# Patient Record
Sex: MALE | Race: WHITE | Employment: FULL TIME | URBAN - METROPOLITAN AREA
[De-identification: names, ages, dates, MRNs, and addresses within clinical notes are randomized per-mention and may not be internally consistent; named-entity substitution may affect disease eponyms.]

---

## 2017-12-26 PROBLEM — M23.92 ACUTE INTERNAL DERANGEMENT OF LEFT KNEE: Status: ACTIVE | Noted: 2017-12-26

## 2017-12-26 PROBLEM — M25.562 LEFT MEDIAL KNEE PAIN: Status: ACTIVE | Noted: 2017-12-26

## 2018-01-03 PROBLEM — S83.242D ACUTE MEDIAL MENISCAL TEAR, LEFT, SUBSEQUENT ENCOUNTER: Status: ACTIVE | Noted: 2018-01-03

## 2018-02-24 ENCOUNTER — HOSPITAL ENCOUNTER (OUTPATIENT)
Age: 43
Discharge: HOME | End: 2018-02-24
Attending: FAMILY MEDICINE
Payer: COMMERCIAL

## 2018-02-24 VITALS
OXYGEN SATURATION: 96 % | HEART RATE: 76 BPM | SYSTOLIC BLOOD PRESSURE: 122 MMHG | DIASTOLIC BLOOD PRESSURE: 82 MMHG | TEMPERATURE: 97 F | BODY MASS INDEX: 24 KG/M2 | WEIGHT: 175 LBS | RESPIRATION RATE: 20 BRPM

## 2018-02-24 DIAGNOSIS — H10.022 OTHER MUCOPURULENT CONJUNCTIVITIS OF LEFT EYE: Primary | ICD-10-CM

## 2018-02-24 PROCEDURE — 99214 OFFICE O/P EST MOD 30 MIN: CPT

## 2018-02-24 PROCEDURE — 99213 OFFICE O/P EST LOW 20 MIN: CPT

## 2018-02-24 RX ORDER — TOBRAMYCIN 3 MG/ML
2 SOLUTION/ DROPS OPHTHALMIC EVERY 4 HOURS
Qty: 1 BOTTLE | Refills: 0 | Status: SHIPPED | OUTPATIENT
Start: 2018-02-24 | End: 2018-03-03

## 2018-02-24 NOTE — ED PROVIDER NOTES
Patient Seen in: [facility]    History   Patient presents with:   Eye Visual Problem (opthalmic): Lt. itching & pain    Stated Complaint: LEFT EYE IRRITATION/PINK EYE    HPI    37year old male presents for left eye irritation an reactive to light. Neck: Normal range of motion. Neck supple. Cardiovascular: Normal rate, regular rhythm, normal heart sounds and intact distal pulses.     Pulmonary/Chest: Effort normal and breath sounds normal.   Neurological: He is alert and oriente

## 2018-02-24 NOTE — ED INITIAL ASSESSMENT (HPI)
Pt. States he started with Lt. Eye irritation & itching on Thursday. Yesterday Lt. Eye pain. Today, woke up with Lt. Eye crusting & drainage. No fever no cold symptoms.

## 2018-12-31 ENCOUNTER — HOSPITAL ENCOUNTER (OUTPATIENT)
Age: 43
Discharge: HOME | End: 2018-12-31
Attending: FAMILY MEDICINE
Payer: COMMERCIAL

## 2018-12-31 VITALS
DIASTOLIC BLOOD PRESSURE: 75 MMHG | TEMPERATURE: 99 F | SYSTOLIC BLOOD PRESSURE: 118 MMHG | RESPIRATION RATE: 20 BRPM | HEART RATE: 82 BPM | OXYGEN SATURATION: 97 %

## 2018-12-31 DIAGNOSIS — S05.02XA ABRASION OF LEFT CORNEA, INITIAL ENCOUNTER: Primary | ICD-10-CM

## 2018-12-31 PROCEDURE — 99213 OFFICE O/P EST LOW 20 MIN: CPT

## 2018-12-31 PROCEDURE — 99214 OFFICE O/P EST MOD 30 MIN: CPT

## 2018-12-31 RX ORDER — OFLOXACIN 3 MG/ML
2 SOLUTION/ DROPS OPHTHALMIC 3 TIMES DAILY
Qty: 5 ML | Refills: 0 | Status: SHIPPED | OUTPATIENT
Start: 2018-12-31 | End: 2019-01-07

## 2018-12-31 NOTE — ED PROVIDER NOTES
Patient Seen in: [facility]    History   Patient presents with:  Eye Problem    Stated Complaint: left eye pink     HPI  This is a 49-year-old male coming in with complaints of discomfort in his left eye, describes this discomfo Nares normal  NECK: FROM, supple  LUNGS: No tachypnea   CV: No tachycardia   ABD: not distended  NEURO: Alert and oriented to person place and time  GAIT: Normal    L eye exam:   - Orbits and periorbital area normal : Yes  - PERRL+ : Yes  - EOMI+: Yes  - C

## 2022-08-12 ENCOUNTER — OFFICE VISIT (OUTPATIENT)
Dept: FAMILY MEDICINE CLINIC | Facility: CLINIC | Age: 47
End: 2022-08-12
Payer: COMMERCIAL

## 2022-08-12 VITALS
WEIGHT: 189 LBS | DIASTOLIC BLOOD PRESSURE: 68 MMHG | OXYGEN SATURATION: 97 % | HEIGHT: 71 IN | BODY MASS INDEX: 26.46 KG/M2 | RESPIRATION RATE: 16 BRPM | HEART RATE: 65 BPM | SYSTOLIC BLOOD PRESSURE: 108 MMHG

## 2022-08-12 DIAGNOSIS — Z82.49 FAMILY HISTORY OF CORONARY ARTERY DISEASE: ICD-10-CM

## 2022-08-12 DIAGNOSIS — Z00.00 WELLNESS EXAMINATION: Primary | ICD-10-CM

## 2022-08-12 DIAGNOSIS — Z00.00 LABORATORY EXAMINATION ORDERED AS PART OF A ROUTINE GENERAL MEDICAL EXAMINATION: ICD-10-CM

## 2022-08-12 DIAGNOSIS — Z12.5 SCREENING FOR MALIGNANT NEOPLASM OF PROSTATE: ICD-10-CM

## 2022-08-12 PROCEDURE — 3008F BODY MASS INDEX DOCD: CPT | Performed by: FAMILY MEDICINE

## 2022-08-12 PROCEDURE — 99386 PREV VISIT NEW AGE 40-64: CPT | Performed by: FAMILY MEDICINE

## 2022-08-12 PROCEDURE — 3078F DIAST BP <80 MM HG: CPT | Performed by: FAMILY MEDICINE

## 2022-08-12 PROCEDURE — 3074F SYST BP LT 130 MM HG: CPT | Performed by: FAMILY MEDICINE

## 2022-08-12 RX ORDER — MULTIVITAMIN
1 TABLET ORAL DAILY
COMMUNITY

## 2022-08-13 ENCOUNTER — LAB ENCOUNTER (OUTPATIENT)
Dept: LAB | Age: 47
End: 2022-08-13
Attending: FAMILY MEDICINE
Payer: COMMERCIAL

## 2022-08-13 DIAGNOSIS — Z12.5 SCREENING FOR MALIGNANT NEOPLASM OF PROSTATE: ICD-10-CM

## 2022-08-13 DIAGNOSIS — Z00.00 LABORATORY EXAMINATION ORDERED AS PART OF A ROUTINE GENERAL MEDICAL EXAMINATION: ICD-10-CM

## 2022-08-13 LAB
ALBUMIN SERPL-MCNC: 4 G/DL (ref 3.4–5)
ALBUMIN/GLOB SERPL: 1.3 {RATIO} (ref 1–2)
ALP LIVER SERPL-CCNC: 73 U/L
ALT SERPL-CCNC: 23 U/L
ANION GAP SERPL CALC-SCNC: 4 MMOL/L (ref 0–18)
AST SERPL-CCNC: 20 U/L (ref 15–37)
BASOPHILS # BLD AUTO: 0.07 X10(3) UL (ref 0–0.2)
BASOPHILS NFR BLD AUTO: 1.2 %
BILIRUB SERPL-MCNC: 0.8 MG/DL (ref 0.1–2)
BUN BLD-MCNC: 12 MG/DL (ref 7–18)
CALCIUM BLD-MCNC: 9.1 MG/DL (ref 8.5–10.1)
CHLORIDE SERPL-SCNC: 107 MMOL/L (ref 98–112)
CHOLEST SERPL-MCNC: 174 MG/DL (ref ?–200)
CO2 SERPL-SCNC: 27 MMOL/L (ref 21–32)
COMPLEXED PSA SERPL-MCNC: 1.34 NG/ML (ref ?–4)
CREAT BLD-MCNC: 1.08 MG/DL
EOSINOPHIL # BLD AUTO: 0.28 X10(3) UL (ref 0–0.7)
EOSINOPHIL NFR BLD AUTO: 4.9 %
ERYTHROCYTE [DISTWIDTH] IN BLOOD BY AUTOMATED COUNT: 12.2 %
FASTING PATIENT LIPID ANSWER: YES
FASTING STATUS PATIENT QL REPORTED: YES
GFR SERPLBLD BASED ON 1.73 SQ M-ARVRAT: 84 ML/MIN/1.73M2 (ref 60–?)
GLOBULIN PLAS-MCNC: 3.2 G/DL (ref 2.8–4.4)
GLUCOSE BLD-MCNC: 94 MG/DL (ref 70–99)
HCT VFR BLD AUTO: 46.7 %
HDLC SERPL-MCNC: 51 MG/DL (ref 40–59)
HGB BLD-MCNC: 15.7 G/DL
IMM GRANULOCYTES # BLD AUTO: 0.04 X10(3) UL (ref 0–1)
IMM GRANULOCYTES NFR BLD: 0.7 %
LDLC SERPL CALC-MCNC: 112 MG/DL (ref ?–100)
LYMPHOCYTES # BLD AUTO: 1.97 X10(3) UL (ref 1–4)
LYMPHOCYTES NFR BLD AUTO: 34.3 %
MCH RBC QN AUTO: 31.7 PG (ref 26–34)
MCHC RBC AUTO-ENTMCNC: 33.6 G/DL (ref 31–37)
MCV RBC AUTO: 94.3 FL
MONOCYTES # BLD AUTO: 0.5 X10(3) UL (ref 0.1–1)
MONOCYTES NFR BLD AUTO: 8.7 %
NEUTROPHILS # BLD AUTO: 2.88 X10 (3) UL (ref 1.5–7.7)
NEUTROPHILS # BLD AUTO: 2.88 X10(3) UL (ref 1.5–7.7)
NEUTROPHILS NFR BLD AUTO: 50.2 %
NONHDLC SERPL-MCNC: 123 MG/DL (ref ?–130)
OSMOLALITY SERPL CALC.SUM OF ELEC: 286 MOSM/KG (ref 275–295)
PLATELET # BLD AUTO: 228 10(3)UL (ref 150–450)
POTASSIUM SERPL-SCNC: 4.1 MMOL/L (ref 3.5–5.1)
PROT SERPL-MCNC: 7.2 G/DL (ref 6.4–8.2)
RBC # BLD AUTO: 4.95 X10(6)UL
SODIUM SERPL-SCNC: 138 MMOL/L (ref 136–145)
TRIGL SERPL-MCNC: 54 MG/DL (ref 30–149)
TSI SER-ACNC: 1.25 MIU/ML (ref 0.36–3.74)
VLDLC SERPL CALC-MCNC: 9 MG/DL (ref 0–30)
WBC # BLD AUTO: 5.7 X10(3) UL (ref 4–11)

## 2022-08-13 PROCEDURE — 80050 GENERAL HEALTH PANEL: CPT | Performed by: FAMILY MEDICINE

## 2022-08-13 PROCEDURE — 80061 LIPID PANEL: CPT | Performed by: FAMILY MEDICINE

## 2022-08-13 PROCEDURE — 84153 ASSAY OF PSA TOTAL: CPT | Performed by: FAMILY MEDICINE

## 2022-08-15 ENCOUNTER — PATIENT MESSAGE (OUTPATIENT)
Dept: FAMILY MEDICINE CLINIC | Facility: CLINIC | Age: 47
End: 2022-08-15

## 2022-08-17 NOTE — TELEPHONE ENCOUNTER
No need for stress test at this time but would recommend to complete coronary calcium screening CT. Based on those results - if we need to, we can order a stress test or talk about starting treatment for his cholesterol if needed. In the meantime I would recommend to follow a low fat heart healthy diet, regular exercise.

## 2022-08-17 NOTE — TELEPHONE ENCOUNTER
Pt asking if he needs to have a stress test? I see you recommended CT calcium. Please advise, thanks.

## 2023-05-09 ENCOUNTER — HOSPITAL ENCOUNTER (OUTPATIENT)
Dept: CT IMAGING | Age: 48
Discharge: HOME | End: 2023-05-09
Attending: FAMILY MEDICINE

## 2023-05-09 ENCOUNTER — TELEPHONE (OUTPATIENT)
Dept: FAMILY MEDICINE CLINIC | Facility: CLINIC | Age: 48
End: 2023-05-09

## 2023-05-09 VITALS — DIASTOLIC BLOOD PRESSURE: 70 MMHG | SYSTOLIC BLOOD PRESSURE: 100 MMHG

## 2023-05-09 DIAGNOSIS — Z82.49 FAMILY HISTORY OF CORONARY ARTERY DISEASE: ICD-10-CM

## 2023-05-09 DIAGNOSIS — Z20.828 EXPOSURE TO HERPES SIMPLEX VIRUS (HSV): Primary | ICD-10-CM

## 2023-05-09 NOTE — TELEPHONE ENCOUNTER
Pj Kind, he just wants a Herpes lab test done. He dosent want the others. Sorry for the confusion.  Hes just coming in for an exam with Eric Alex, I put it under physical.

## 2023-05-09 NOTE — PROGRESS NOTES
Date of Service 2023    Vincent Mays  Date of Birth 1975    Patient Age: 46year old    PCP: Emelina Calderón MD  [id number]  [facility] [address]    Consult Type  Type Scan/Screening: Heart Scan  Preliminary Heart Scan Score: 13.59              Lipid Profile  Cholesterol: 174, done on 2022. HDL Cholesterol: 51, done on 2022. LDL Cholesterol: 112, done on 2022. TriGlycerides 54, done on 2022. Nurse Review  Risk factor information and results reviewed with Nurse: Yes    Recommended Follow Up:  Consult your physician regarding[de-identified]   Final Heart Scan Report; Discuss potential for Incidental Finding        Recommendations for Change:  Nutrition Changes: Low Saturated Fat;Low Fat Dairy; Increase Fiber     Cholesterol Modification (goal of therapy depends upon your risk):   Decrease LDL (Lousy/Bad) Ideal <100    Exercise: Enhance Current Program           Repeat Heart Scan:   3 Years if Calcium Score is > 0.0;  5 years if Calcium Score is 0. 0; Discuss with your Physician     Other[de-identified] Today's blood pressure readin/70; left arm, sitting. Yesenia Recommended Resources:  Recommended Resources: Upcoming Classes, Medical Services and [facility]. [facility]. Esau Wise RN        Please Contact the Nurse Heart Line with any Questions or Concerns [phone].

## 2023-05-09 NOTE — TELEPHONE ENCOUNTER
Annual physical scheduled for 05/24/202    Please order labs. Patient informed to get labs done no sooner than a week prior to their scheduled annual physical.    He is also requesting additional labs to test for Herpes, is wife currently has it also.

## 2023-05-11 ENCOUNTER — LAB ENCOUNTER (OUTPATIENT)
Dept: LAB | Age: 48
End: 2023-05-11
Attending: FAMILY MEDICINE
Payer: COMMERCIAL

## 2023-05-11 DIAGNOSIS — Z20.828 EXPOSURE TO HERPES SIMPLEX VIRUS (HSV): ICD-10-CM

## 2023-05-11 PROCEDURE — 87529 HSV DNA AMP PROBE: CPT | Performed by: FAMILY MEDICINE

## 2023-05-16 LAB
HSV-1 DNA: NEGATIVE
HSV-2 DNA: NEGATIVE

## 2023-05-24 ENCOUNTER — OFFICE VISIT (OUTPATIENT)
Dept: FAMILY MEDICINE CLINIC | Facility: CLINIC | Age: 48
End: 2023-05-24
Payer: COMMERCIAL

## 2023-05-24 VITALS
WEIGHT: 174 LBS | RESPIRATION RATE: 16 BRPM | BODY MASS INDEX: 24.36 KG/M2 | DIASTOLIC BLOOD PRESSURE: 74 MMHG | OXYGEN SATURATION: 97 % | SYSTOLIC BLOOD PRESSURE: 104 MMHG | HEIGHT: 71 IN | HEART RATE: 71 BPM

## 2023-05-24 DIAGNOSIS — Z12.11 COLON CANCER SCREENING: ICD-10-CM

## 2023-05-24 DIAGNOSIS — Z00.00 LABORATORY EXAMINATION ORDERED AS PART OF A ROUTINE GENERAL MEDICAL EXAMINATION: ICD-10-CM

## 2023-05-24 DIAGNOSIS — R93.1 ELEVATED CORONARY ARTERY CALCIUM SCORE: Primary | ICD-10-CM

## 2023-05-24 PROCEDURE — 3074F SYST BP LT 130 MM HG: CPT | Performed by: FAMILY MEDICINE

## 2023-05-24 PROCEDURE — 3078F DIAST BP <80 MM HG: CPT | Performed by: FAMILY MEDICINE

## 2023-05-24 PROCEDURE — 99213 OFFICE O/P EST LOW 20 MIN: CPT | Performed by: FAMILY MEDICINE

## 2023-05-24 PROCEDURE — 3008F BODY MASS INDEX DOCD: CPT | Performed by: FAMILY MEDICINE

## 2023-06-27 ENCOUNTER — HOSPITAL ENCOUNTER (OUTPATIENT)
Age: 48
Discharge: HOME | End: 2023-06-27
Payer: COMMERCIAL

## 2023-06-27 ENCOUNTER — APPOINTMENT (OUTPATIENT)
Dept: CT IMAGING | Age: 48
End: 2023-06-27
Payer: COMMERCIAL

## 2023-06-27 ENCOUNTER — TELEPHONE (OUTPATIENT)
Dept: FAMILY MEDICINE CLINIC | Facility: CLINIC | Age: 48
End: 2023-06-27

## 2023-06-27 VITALS
BODY MASS INDEX: 23.52 KG/M2 | DIASTOLIC BLOOD PRESSURE: 71 MMHG | TEMPERATURE: 97 F | HEART RATE: 66 BPM | OXYGEN SATURATION: 99 % | SYSTOLIC BLOOD PRESSURE: 120 MMHG | HEIGHT: 71 IN | WEIGHT: 168 LBS | RESPIRATION RATE: 20 BRPM

## 2023-06-27 DIAGNOSIS — K57.90 DIVERTICULOSIS: ICD-10-CM

## 2023-06-27 DIAGNOSIS — K59.00 CONSTIPATION, UNSPECIFIED CONSTIPATION TYPE: Primary | ICD-10-CM

## 2023-06-27 LAB
#MXD IC: 0.6 X10ˆ3/UL (ref 0.1–1)
BILIRUB UR QL STRIP: NEGATIVE
BUN BLD-MCNC: 23 MG/DL (ref 7–18)
CHLORIDE BLD-SCNC: 98 MMOL/L (ref 98–112)
CO2 BLD-SCNC: 30 MMOL/L (ref 21–32)
CREAT BLD-MCNC: 1.4 MG/DL
GFR SERPLBLD BASED ON 1.73 SQ M-ARVRAT: 61 ML/MIN/1.73M2 (ref 60–?)
GLUCOSE BLD-MCNC: 87 MG/DL (ref 70–99)
GLUCOSE UR STRIP-MCNC: NEGATIVE MG/DL
HCT VFR BLD AUTO: 44.8 %
HCT VFR BLD CALC: 46 %
HGB BLD-MCNC: 15 G/DL
HGB UR QL STRIP: NEGATIVE
ISTAT IONIZED CALCIUM FOR CHEM 8: 1.29 MMOL/L (ref 1.12–1.32)
KETONES UR STRIP-MCNC: NEGATIVE MG/DL
LEUKOCYTE ESTERASE UR QL STRIP: NEGATIVE
LYMPHOCYTES # BLD AUTO: 1.4 X10ˆ3/UL (ref 1–4)
LYMPHOCYTES NFR BLD AUTO: 28.2 %
MCH RBC QN AUTO: 31.1 PG (ref 26–34)
MCHC RBC AUTO-ENTMCNC: 33.5 G/DL (ref 31–37)
MCV RBC AUTO: 92.9 FL (ref 80–100)
MIXED CELL %: 11.3 %
NEUTROPHILS # BLD AUTO: 2.9 X10ˆ3/UL (ref 1.5–7.7)
NEUTROPHILS NFR BLD AUTO: 60.5 %
NITRITE UR QL STRIP: NEGATIVE
PH UR STRIP: 6 [PH]
PLATELET # BLD AUTO: 244 X10ˆ3/UL (ref 150–450)
POTASSIUM BLD-SCNC: 3.9 MMOL/L (ref 3.6–5.1)
PROT UR STRIP-MCNC: NEGATIVE MG/DL
RBC # BLD AUTO: 4.82 X10ˆ6/UL
SODIUM BLD-SCNC: 138 MMOL/L (ref 136–145)
SP GR UR STRIP: 1.02
UROBILINOGEN UR STRIP-ACNC: <2 MG/DL
WBC # BLD AUTO: 4.9 X10ˆ3/UL (ref 4–11)

## 2023-06-27 PROCEDURE — 80047 BASIC METABLC PNL IONIZED CA: CPT

## 2023-06-27 PROCEDURE — 81002 URINALYSIS NONAUTO W/O SCOPE: CPT

## 2023-06-27 PROCEDURE — 36415 COLL VENOUS BLD VENIPUNCTURE: CPT

## 2023-06-27 PROCEDURE — 99204 OFFICE O/P NEW MOD 45 MIN: CPT

## 2023-06-27 PROCEDURE — 85025 COMPLETE CBC W/AUTO DIFF WBC: CPT

## 2023-06-27 PROCEDURE — 74177 CT ABD & PELVIS W/CONTRAST: CPT

## 2023-06-27 NOTE — DISCHARGE INSTRUCTIONS
Your CT showed a moderate amount of stool in your colon. There is also some diverticulosis, no diverticulitis. Please use MiraLAX for the constipation. She drink plenty of fluids. Increase dietary fiber. You do have a small, nonobstructing kidney stone. Please make an appointment to follow-up with your primary care provider. If you develop any acutely worsening abdominal pain, fever or any other concerns or complaints you should go to the emergency department.

## 2023-06-27 NOTE — ED INITIAL ASSESSMENT (HPI)
Patient arrives ambulatory with c/o left lower back pain that radiates around to the front abdomen since Saturday. Denies urinary symptoms. States nausea. Patient reports hx kidney stones and states this pain is similar. Denies fevers.

## 2023-07-26 ENCOUNTER — HOSPITAL ENCOUNTER (OUTPATIENT)
Dept: CV DIAGNOSTICS | Facility: HOSPITAL | Age: 48
Discharge: HOME | End: 2023-07-26
Attending: FAMILY MEDICINE
Payer: COMMERCIAL

## 2023-07-26 DIAGNOSIS — R93.1 ELEVATED CORONARY ARTERY CALCIUM SCORE: ICD-10-CM

## 2023-07-26 LAB
% OF MAX PREDICTED HR: 100 %
MAX DIASTOLIC BP: 66 MMHG
MAX HEART RATE: 176 BPM
MAX PREDICTED HEART RATE: 170 BPM
MAX SYSTOLIC BP: 172 MMHG
MAX WORK LOAD: 203

## 2023-07-26 PROCEDURE — 93017 CV STRESS TEST TRACING ONLY: CPT | Performed by: FAMILY MEDICINE

## 2023-07-26 PROCEDURE — 93016 CV STRESS TEST SUPVJ ONLY: CPT | Performed by: INTERNAL MEDICINE

## 2023-07-26 PROCEDURE — 93018 CV STRESS TEST I&R ONLY: CPT | Performed by: INTERNAL MEDICINE

## 2023-08-14 ENCOUNTER — PATIENT MESSAGE (OUTPATIENT)
Dept: FAMILY MEDICINE CLINIC | Facility: CLINIC | Age: 48
End: 2023-08-14

## 2023-08-14 DIAGNOSIS — Z12.5 SCREENING FOR PROSTATE CANCER: Primary | ICD-10-CM

## 2023-08-15 NOTE — TELEPHONE ENCOUNTER
From: Frandy Erickson  To: José Luis Cruz MD  Sent: 8/14/2023 4:44 PM CDT  Subject: Labs for august 21 physical    Hello, I have an appointment for a physical on the 21st and want to make sure I get any labs done in time for the appointment. Can someone please order any labs I need so I can get them done? Thanks!

## 2023-08-17 ENCOUNTER — LAB ENCOUNTER (OUTPATIENT)
Dept: LAB | Facility: REFERENCE LAB | Age: 48
End: 2023-08-17
Attending: FAMILY MEDICINE
Payer: COMMERCIAL

## 2023-08-17 DIAGNOSIS — Z00.00 LABORATORY EXAMINATION ORDERED AS PART OF A ROUTINE GENERAL MEDICAL EXAMINATION: ICD-10-CM

## 2023-08-17 DIAGNOSIS — Z12.5 SCREENING FOR PROSTATE CANCER: ICD-10-CM

## 2023-08-17 LAB
ALBUMIN SERPL-MCNC: 4.1 G/DL (ref 3.4–5)
ALBUMIN/GLOB SERPL: 1.3 {RATIO} (ref 1–2)
ALP LIVER SERPL-CCNC: 70 U/L
ALT SERPL-CCNC: 34 U/L
ANION GAP SERPL CALC-SCNC: 6 MMOL/L (ref 0–18)
AST SERPL-CCNC: 26 U/L (ref 15–37)
BASOPHILS # BLD AUTO: 0.04 X10(3) UL (ref 0–0.2)
BASOPHILS NFR BLD AUTO: 0.7 %
BILIRUB SERPL-MCNC: 0.9 MG/DL (ref 0.1–2)
BUN BLD-MCNC: 18 MG/DL (ref 7–18)
BUN/CREAT SERPL: 15.7 (ref 10–20)
CALCIUM BLD-MCNC: 9.9 MG/DL (ref 8.5–10.1)
CHLORIDE SERPL-SCNC: 103 MMOL/L (ref 98–112)
CHOLEST SERPL-MCNC: 197 MG/DL (ref ?–200)
CO2 SERPL-SCNC: 30 MMOL/L (ref 21–32)
COMPLEXED PSA SERPL-MCNC: 1.22 NG/ML (ref ?–4)
CREAT BLD-MCNC: 1.15 MG/DL
DEPRECATED RDW RBC AUTO: 39.5 FL (ref 35.1–46.3)
EGFRCR SERPLBLD CKD-EPI 2021: 78 ML/MIN/1.73M2 (ref 60–?)
EOSINOPHIL # BLD AUTO: 0.13 X10(3) UL (ref 0–0.7)
EOSINOPHIL NFR BLD AUTO: 2.2 %
ERYTHROCYTE [DISTWIDTH] IN BLOOD BY AUTOMATED COUNT: 11.6 % (ref 11–15)
FASTING PATIENT LIPID ANSWER: YES
FASTING STATUS PATIENT QL REPORTED: YES
GLOBULIN PLAS-MCNC: 3.1 G/DL (ref 2.8–4.4)
GLUCOSE BLD-MCNC: 92 MG/DL (ref 70–99)
HCT VFR BLD AUTO: 46.8 %
HDLC SERPL-MCNC: 54 MG/DL (ref 40–59)
HGB BLD-MCNC: 15.9 G/DL
IMM GRANULOCYTES # BLD AUTO: 0.04 X10(3) UL (ref 0–1)
IMM GRANULOCYTES NFR BLD: 0.7 %
LDLC SERPL CALC-MCNC: 130 MG/DL (ref ?–100)
LYMPHOCYTES # BLD AUTO: 1.72 X10(3) UL (ref 1–4)
LYMPHOCYTES NFR BLD AUTO: 28.8 %
MCH RBC QN AUTO: 31.5 PG (ref 26–34)
MCHC RBC AUTO-ENTMCNC: 34 G/DL (ref 31–37)
MCV RBC AUTO: 92.7 FL
MONOCYTES # BLD AUTO: 0.63 X10(3) UL (ref 0.1–1)
MONOCYTES NFR BLD AUTO: 10.6 %
NEUTROPHILS # BLD AUTO: 3.41 X10 (3) UL (ref 1.5–7.7)
NEUTROPHILS # BLD AUTO: 3.41 X10(3) UL (ref 1.5–7.7)
NEUTROPHILS NFR BLD AUTO: 57 %
NONHDLC SERPL-MCNC: 143 MG/DL (ref ?–130)
OSMOLALITY SERPL CALC.SUM OF ELEC: 290 MOSM/KG (ref 275–295)
PLATELET # BLD AUTO: 226 10(3)UL (ref 150–450)
POTASSIUM SERPL-SCNC: 3.6 MMOL/L (ref 3.5–5.1)
PROT SERPL-MCNC: 7.2 G/DL (ref 6.4–8.2)
RBC # BLD AUTO: 5.05 X10(6)UL
SODIUM SERPL-SCNC: 139 MMOL/L (ref 136–145)
TRIGL SERPL-MCNC: 73 MG/DL (ref 30–149)
TSI SER-ACNC: 0.83 MIU/ML (ref 0.36–3.74)
VLDLC SERPL CALC-MCNC: 13 MG/DL (ref 0–30)
WBC # BLD AUTO: 6 X10(3) UL (ref 4–11)

## 2023-08-17 PROCEDURE — 80050 GENERAL HEALTH PANEL: CPT | Performed by: FAMILY MEDICINE

## 2023-08-17 PROCEDURE — 84153 ASSAY OF PSA TOTAL: CPT | Performed by: FAMILY MEDICINE

## 2023-08-17 PROCEDURE — 80061 LIPID PANEL: CPT | Performed by: FAMILY MEDICINE

## 2023-08-21 ENCOUNTER — OFFICE VISIT (OUTPATIENT)
Dept: FAMILY MEDICINE CLINIC | Facility: CLINIC | Age: 48
End: 2023-08-21
Payer: COMMERCIAL

## 2023-08-21 VITALS
HEART RATE: 66 BPM | BODY MASS INDEX: 24.36 KG/M2 | HEIGHT: 71 IN | RESPIRATION RATE: 16 BRPM | SYSTOLIC BLOOD PRESSURE: 122 MMHG | WEIGHT: 174 LBS | OXYGEN SATURATION: 98 % | DIASTOLIC BLOOD PRESSURE: 80 MMHG

## 2023-08-21 DIAGNOSIS — E78.00 PURE HYPERCHOLESTEROLEMIA: ICD-10-CM

## 2023-08-21 DIAGNOSIS — R15.0 INCOMPLETE PASSAGE OF STOOL: ICD-10-CM

## 2023-08-21 DIAGNOSIS — N52.9 ERECTILE DYSFUNCTION, UNSPECIFIED ERECTILE DYSFUNCTION TYPE: ICD-10-CM

## 2023-08-21 DIAGNOSIS — Z12.83 SKIN CANCER SCREENING: ICD-10-CM

## 2023-08-21 DIAGNOSIS — Z00.00 WELLNESS EXAMINATION: Primary | ICD-10-CM

## 2023-08-21 PROBLEM — H90.3 SENSORINEURAL HEARING LOSS (SNHL) OF BOTH EARS: Status: ACTIVE | Noted: 2023-08-21

## 2023-08-21 PROCEDURE — 3074F SYST BP LT 130 MM HG: CPT | Performed by: FAMILY MEDICINE

## 2023-08-21 PROCEDURE — 3008F BODY MASS INDEX DOCD: CPT | Performed by: FAMILY MEDICINE

## 2023-08-21 PROCEDURE — 3079F DIAST BP 80-89 MM HG: CPT | Performed by: FAMILY MEDICINE

## 2023-08-21 PROCEDURE — 99396 PREV VISIT EST AGE 40-64: CPT | Performed by: FAMILY MEDICINE

## 2023-08-21 RX ORDER — SILDENAFIL 50 MG/1
50 TABLET, FILM COATED ORAL
Qty: 10 TABLET | Refills: 2 | Status: SHIPPED | OUTPATIENT
Start: 2023-08-21

## 2023-11-28 ENCOUNTER — OFFICE VISIT (OUTPATIENT)
Dept: FAMILY MEDICINE CLINIC | Facility: CLINIC | Age: 48
End: 2023-11-28
Payer: COMMERCIAL

## 2023-11-28 VITALS
TEMPERATURE: 98 F | SYSTOLIC BLOOD PRESSURE: 130 MMHG | HEART RATE: 65 BPM | HEIGHT: 71 IN | OXYGEN SATURATION: 98 % | RESPIRATION RATE: 18 BRPM | WEIGHT: 170 LBS | BODY MASS INDEX: 23.8 KG/M2 | DIASTOLIC BLOOD PRESSURE: 82 MMHG

## 2023-11-28 DIAGNOSIS — U07.1 COVID-19: Primary | ICD-10-CM

## 2023-11-28 LAB
OPERATOR ID: ABNORMAL
POCT LOT NUMBER: ABNORMAL
RAPID SARS-COV-2 BY PCR: DETECTED

## 2023-11-28 PROCEDURE — U0002 COVID-19 LAB TEST NON-CDC: HCPCS | Performed by: PHYSICIAN ASSISTANT

## 2023-11-28 PROCEDURE — 3075F SYST BP GE 130 - 139MM HG: CPT | Performed by: PHYSICIAN ASSISTANT

## 2023-11-28 PROCEDURE — 99213 OFFICE O/P EST LOW 20 MIN: CPT | Performed by: PHYSICIAN ASSISTANT

## 2023-11-28 PROCEDURE — 3079F DIAST BP 80-89 MM HG: CPT | Performed by: PHYSICIAN ASSISTANT

## 2023-11-28 PROCEDURE — 3008F BODY MASS INDEX DOCD: CPT | Performed by: PHYSICIAN ASSISTANT

## 2023-11-28 RX ORDER — ALBUTEROL SULFATE 90 UG/1
AEROSOL, METERED RESPIRATORY (INHALATION)
Qty: 1 EACH | Refills: 0 | Status: SHIPPED | OUTPATIENT
Start: 2023-11-28

## 2023-12-11 DIAGNOSIS — N52.9 ERECTILE DYSFUNCTION, UNSPECIFIED ERECTILE DYSFUNCTION TYPE: ICD-10-CM

## 2023-12-12 RX ORDER — SILDENAFIL 50 MG/1
50 TABLET, FILM COATED ORAL
Qty: 10 TABLET | Refills: 11 | Status: SHIPPED | OUTPATIENT
Start: 2023-12-12

## 2024-01-29 ENCOUNTER — PATIENT MESSAGE (OUTPATIENT)
Dept: FAMILY MEDICINE CLINIC | Facility: CLINIC | Age: 49
End: 2024-01-29

## 2024-01-29 NOTE — TELEPHONE ENCOUNTER
From: Onel Em  To: Paul Quiles  Sent: 1/29/2024 3:41 PM CST  Subject: Prescription refills    Hello, you gave me a prescription for sildenafil for 10 pills per month. Sal is only giving me 8. Can you let me know if something has to be communicated to the insurance company, or is 8 the most they will allow? The medication has been effective and 8 per month isn’t sufficient (good problem to have). If you’re not the person to ask, can you point me in the right direction? Thanks.    Tru

## 2024-07-09 ENCOUNTER — APPOINTMENT (OUTPATIENT)
Dept: GENERAL RADIOLOGY | Age: 49
End: 2024-07-09
Attending: PHYSICIAN ASSISTANT
Payer: COMMERCIAL

## 2024-07-09 ENCOUNTER — HOSPITAL ENCOUNTER (OUTPATIENT)
Age: 49
Discharge: HOME | End: 2024-07-09
Payer: COMMERCIAL

## 2024-07-09 VITALS
TEMPERATURE: 98 F | OXYGEN SATURATION: 99 % | HEART RATE: 73 BPM | RESPIRATION RATE: 20 BRPM | SYSTOLIC BLOOD PRESSURE: 117 MMHG | DIASTOLIC BLOOD PRESSURE: 77 MMHG

## 2024-07-09 DIAGNOSIS — M20.011 MALLET FINGER OF RIGHT HAND: Primary | ICD-10-CM

## 2024-07-09 DIAGNOSIS — S56.419A TRAUMATIC RUPTURE OF EXTENSOR TENDON OF FINGER: ICD-10-CM

## 2024-07-09 DIAGNOSIS — S69.91XA INJURY OF FINGER OF RIGHT HAND, INITIAL ENCOUNTER: ICD-10-CM

## 2024-07-09 PROCEDURE — A4570 SPLINT: HCPCS | Performed by: PHYSICIAN ASSISTANT

## 2024-07-09 PROCEDURE — 73140 X-RAY EXAM OF FINGER(S): CPT | Performed by: PHYSICIAN ASSISTANT

## 2024-07-09 PROCEDURE — 99213 OFFICE O/P EST LOW 20 MIN: CPT | Performed by: PHYSICIAN ASSISTANT

## 2024-07-09 NOTE — ED PROVIDER NOTES
Patient Seen in: [facility]      History     Chief Complaint   Patient presents with    Arm or Hand Injury     Stated Complaint: right finger injury    Subjective:   HPI    Patient is a 49-year-old male, right-hand-dominant, presenting to immediate care for evaluation of acute traumatic right middle finger injury.  Onset: Yesterday.  Patient was taking off longsleeve dress shirt and middle finger right hand got stuck between surgery.  Pulling injury.  Since has been having right middle fingertip pain and deformity.  Stuck and bent condition.  Unable to extend.  Denies prior episodes.  No associated swelling or bruising.  No numbness or weakness.  For possible underlying fracture or dislocation.      Objective:   Past Medical History:    Kidney stone              Past Surgical History:   Procedure Laterality Date    Knee scope,med/lat menisectomy Left 7/1/2016    Procedure: KNEE ARTHROSCOPY;  Surgeon: Eusebio Lindo MD;  Location: [facility]                Social History     Socioeconomic History    Marital status:    Tobacco Use    Smoking status: Never    Smokeless tobacco: Never   Vaping Use    Vaping status: Never Used   Substance and Sexual Activity    Alcohol use: Yes     Comment: social     Drug use: Never              Review of Systems   Musculoskeletal:  Negative for joint swelling.        Right middle fingertip pain and deformity   Allergic/Immunologic: Negative for immunocompromised state.   Neurological:  Negative for weakness and numbness.   Psychiatric/Behavioral:  Negative for confusion.        Positive for stated Chief Complaint: Arm or Hand Injury    Other systems are as noted in HPI.  Constitutional and vital signs reviewed.      All other systems reviewed and negative except as noted above.    Physical Exam     ED Triage Vitals [07/09/24 1546]   /77   Pulse 73   Resp 20   Temp 97.5 °F (36.4 °C)   Temp src Temporal   SpO2 99 %   O2 Device None (Room air)        Current Vitals:   Vital Signs  BP: 117/77  Pulse: 73  Resp: 20  Temp: 97.5 °F (36.4 °C)  Temp src: Temporal    Oxygen Therapy  SpO2: 99 %  O2 Device: None (Room air)            Physical Exam  Vitals and nursing note reviewed.   Constitutional:       General: He is not in acute distress.     Appearance: Normal appearance. He is not ill-appearing, toxic-appearing or diaphoretic.   HENT:      Head: Normocephalic and atraumatic.      Mouth/Throat:      Mouth: Mucous membranes are moist.   Eyes:      Conjunctiva/sclera: Conjunctivae normal.   Cardiovascular:      Pulses: Normal pulses.   Pulmonary:      Effort: No respiratory distress.   Musculoskeletal:         General: Tenderness, deformity and signs of injury present. No swelling.      Comments: Right middle fingertip in slight flexion.  Unable to extend active.  Minimally tender.  No ecchymosis.  No redness.  Skin and nail intact.  Capillary refill is 2 seconds.  Normal sensory.  No cool or warm extremity   Neurological:      General: No focal deficit present.      Mental Status: He is alert and oriented to person, place, and time.      Motor: No weakness.      Gait: Gait normal.   Psychiatric:         Mood and Affect: Mood normal.         Behavior: Behavior normal.             ED Course   Labs Reviewed - No data to display  XR FINGER(S) (MIN 2 VIEWS), RIGHT 3RD (CPT=73140)   Final Result   PROCEDURE: XR FINGER(S) (MIN 2 VIEWS), RIGHT 3RD (CPT=73140)       COMPARISON: None available.       INDICATIONS: Right 3rd digit pain following acute injury.       TECHNIQUE: 3 views were obtained.         FINDINGS:    BONES: No acute fracture is evident. An obliquely oriented lucency of the    proximal phalanx is well corticated and may reflect a nutrient foramen. A    hyperextension deformity of the proximal interphalangeal joint is    demonstrated. No suspicious osseous    lesions are seen. The joint spaces are preserved without evidence of    significant arthropathy.     SOFT TISSUES: Circumferential soft tissue swelling is apparent. Negative    for discernible radiopaque foreign body.   EFFUSION: None visible.                         =====   CONCLUSION:    Hyperextension deformity of the proximal interphalangeal joint of the    right 3rd digit (middle/long finger).               Dictated by (CST): Hair Denny MD on 7/09/2024 at 4:14 PM        Finalized by (CST): Hair Denny MD on 7/09/2024 at 4:15 PM                          MDM       Patient is a 49-year-old male, presenting to immediate care for evaluation of acute traumatic right middle finger injury yesterday.  Associated pain and deformity at fingertip of right middle finger.  Unable to extend finger.  Initial evaluation with x-ray imaging negative for fracture or dislocation.  There is hyperextension deformity at interphalanx joint proximal.  On examination and history consistent with mallet finger deformity, extensor tendon disruption/rupture of right middle finger.  Will treat outpatient supportively.  Mallet finger splint for hyperextension and immobilization.  Discharge instructions on mallet finger provided.  Hand orthopedic follow-up                             Medical Decision Making      Disposition and Plan     Clinical Impression:  1. Mallet finger of right hand    2. Injury of finger of right hand, initial encounter    3. Traumatic rupture of extensor tendon of finger         Disposition:  Discharge  7/9/2024  4:19 pm    Follow-up:  Etienne Carroll MD  [address] [place] [id number]  [phone]    Schedule an appointment as soon as possible for a visit   Hand Orthopedics, For evaluation and management of left mallet finger          Medications Prescribed:  Current Discharge Medication List

## 2024-07-09 NOTE — ED INITIAL ASSESSMENT (HPI)
Pt states yesterday was taking off a long sleeve shirt and his 3rd finger of right hand got stuck in shirt some how and then felt something painful at first joint of finger. Pt states has been stuck in a bended position since and painful to make a fist.

## 2024-07-25 ENCOUNTER — OFFICE VISIT (OUTPATIENT)
Dept: SURGERY | Facility: CLINIC | Age: 49
End: 2024-07-25

## 2024-07-25 DIAGNOSIS — M20.011 ACQUIRED MALLET DEFORMITY OF FINGER OF RIGHT HAND: Primary | ICD-10-CM

## 2024-07-25 DIAGNOSIS — M20.011 MALLET FINGER OF RIGHT HAND: Primary | ICD-10-CM

## 2024-07-25 PROCEDURE — 29130 APPL FINGER SPLINT STATIC: CPT | Performed by: OCCUPATIONAL THERAPIST

## 2024-07-25 PROCEDURE — 99204 OFFICE O/P NEW MOD 45 MIN: CPT | Performed by: PLASTIC SURGERY

## 2024-07-25 NOTE — H&P
Onel Em is a 49 year old male that presents with   Chief Complaint   Patient presents with    Fracture     RMF Mallet   .    REFERRED BY:  Dipesh Martinez    Pacemaker: No  Latex Allergy: no  Coumadin: No  Plavix: No  Other anticoagulants: No  Diet medication: No  Cardiac stents: No    HAND DOMINANCE:  Right    Profession: Education/superintendent     RECONSTRUCTIVE HISTORY    SUN EXPOSURE   Current no   Past yes   Sunburns yes   Tanning salons current no   Tanning salons past no     SKIN CANCER    Personal history of skin cancer: none      HPI:       Injury 1: RMF mallet without fracture, seen 17 days postinjury  - Date: 07/08/24  - Days Since: 17            49-year-old male right-hand-dominant with RMF mallet    Finger: Uninsured    Immediate care, x-rayed and splinted            Review of Systems:   Constitutional: No change in appetite, chill/rigors, or fatigue  GI: No jaundice  Endocrine: No generalized weakness  Neurological: No aphasia, loss of consciousness, or seizures    Musculoskeletal:      Date of injury 7/8/24     Location right      middle finger   DIP   Mechanism Finger caught in shirt while taking it off     Treatment Seen in immediate care,  7/8/24 xray splinted       Pain  no     Numbness None      PMH:     MEDICAL  Past Medical History:    Kidney stone        SURGICAL  Past Surgical History:   Procedure Laterality Date    Knee scope,med/lat menisectomy Left 7/1/2016    Procedure: KNEE ARTHROSCOPY;  Surgeon: Eusebio Lindo MD;  Location: [facility]  No Known Allergies     MEDICATIONS  Current Outpatient Medications   Medication Sig Dispense Refill    Sildenafil Citrate 50 MG Oral Tab Take 1 tablet (50 mg total) by mouth daily as needed for Erectile Dysfunction. 10 tablet 11    albuterol (PROAIR HFA) 108 (90 Base) MCG/ACT Inhalation Aero Soln 2 puffs every 4-6 hours prn wheezing or shortness of breath 1 each 0    Multiple Vitamin Oral Tab Take 1 tablet by mouth  daily.          SOCIAL HISTORY  Social History     Socioeconomic History    Marital status:    Tobacco Use    Smoking status: Never    Smokeless tobacco: Never   Vaping Use    Vaping status: Never Used   Substance and Sexual Activity    Alcohol use: Yes     Comment: social     Drug use: Never   Other Topics Concern    Right Handed Yes        FAMILY HISTORY  Family History   Problem Relation Age of Onset    Hypertension Mother     Heart Attack Father     Heart Attack Brother           PHYSICAL EXAM:     CONSTITUTIONAL: Overall appearance - Normal  HEENT: Normocephalic  EYES: Conjunctiva - Right: Normal, Left: Normal; EOMI  EARS: Inspection - Right: Normal, Left: Normal  NECK/THYROID: Inspection - Normal, Palpation - Normal, Thyroid gland - Normal, No adenopathy  RESPIRATORY: Inspection - Normal, Effort - Normal  CARDIOVASCULAR: Regular rhythm, No murmurs  ABDOMEN: Inspection - Normal, No abdominal tenderness  NEURO: Memory intact  PSYCH: Oriented to person, place, time, and situation, Appropriate mood and affect      Hand Physical Exam:     -20 mallet with no extension against resistance  Collateral ligaments intact    X-ray independently interpreted: No fracture    ASSESSMENT/PLAN:     Mallet RMF closed without fracture        We discussed what a mallet is, including treatment options.  This requires two months of immobilization, followed by gentle ROM exercises and possible therapy.  It may require resplinting after two months.  Even with satisfactory healing, because the tendon is so thin and has such a short excursion, there may be residual mallet deformity and limitation of flexion.  The digit may not regain normal ROM.  Questions were answered and the patient wishes to proceed with treatment.         OT for splinting  OT weekly  Do not remove splint  We discussed restrictions    4 weeks          7/25/2024  Etienne Meneses MD        +++++++++++++++++++++++++++++++++++++++++      MEDICAL DECISION  MAKING    PROBLEMS      MODERATE    (number / complexity)          Acute complicated injury    DATA         MODERATE    (amount / complexity)          X-rays independently reviewed    MANAGEMENT RISK  LOW    (complications/ morbidity)       Splint/OT                  MDM LEVEL    MODERATE    '

## 2024-07-25 NOTE — PROGRESS NOTES
Subjective: I hurt my finger putting my shirt on.      Objective:     Current level of performance:  ADL: Independent  Work:    Leisure: Not addressed    Measurements/Tests:  ROM:         N/A         Treatment Provided this day: Fabricated a RMF extension splint per order.    Treatment Time: 20 minutes      Summary/Analysis of Treatment session: Tolerated the fabrication of a RMF extension splint well.      Plan: To be compliant with the wear and care of RMF extension splint x 1 month.      Follow up in:  Weekly splint adjustments.          Bharath Ramirez  OTR/L

## 2024-08-05 ENCOUNTER — APPOINTMENT (OUTPATIENT)
Dept: SURGERY | Facility: CLINIC | Age: 49
End: 2024-08-05

## 2024-08-05 DIAGNOSIS — M25.641 JOINT STIFFNESS OF HAND, RIGHT: ICD-10-CM

## 2024-08-05 DIAGNOSIS — M62.81 DISTAL MUSCLE WEAKNESS: Primary | ICD-10-CM

## 2024-08-05 PROCEDURE — 97760 ORTHOTIC MGMT&TRAING 1ST ENC: CPT | Performed by: OCCUPATIONAL THERAPIST

## 2024-08-15 ENCOUNTER — OFFICE VISIT (OUTPATIENT)
Dept: SURGERY | Facility: CLINIC | Age: 49
End: 2024-08-15

## 2024-08-15 DIAGNOSIS — M62.81 DISTAL MUSCLE WEAKNESS: Primary | ICD-10-CM

## 2024-08-15 DIAGNOSIS — M25.641 JOINT STIFFNESS OF HAND, RIGHT: ICD-10-CM

## 2024-08-15 PROCEDURE — 97760 ORTHOTIC MGMT&TRAING 1ST ENC: CPT | Performed by: OCCUPATIONAL THERAPIST

## 2024-08-15 NOTE — PROGRESS NOTES
Subjective: Everything is good.      Objective:     Current level of performance:  ADL: Independent  Work:    Leisure: Not addressed    Measurements/Tests:  ROM:         N/A         Treatment Provided this day: Adjusted RMF extension splint per order:    Treatment Time: 10 minutes      Summary/Analysis of Treatment session: Patient has been compliant with the wear and care of RMF extension splint per order.      Plan: To continue to be compliant with the wear and care of RMF extension splint x 2 weeks.      Follow up in:  Weekly splint adjustments.          Bharath Ramirez  OTR/L

## 2024-08-29 ENCOUNTER — TELEPHONE (OUTPATIENT)
Dept: SURGERY | Facility: CLINIC | Age: 49
End: 2024-08-29

## 2024-08-29 NOTE — TELEPHONE ENCOUNTER
Left message on pt's home and mobile number we needed to cancel and reschedule todays appointment due to staffing issues.  Please call to let us know you got message and to reschedule.

## 2024-09-05 ENCOUNTER — APPOINTMENT (OUTPATIENT)
Dept: SURGERY | Facility: CLINIC | Age: 49
End: 2024-09-05

## 2024-09-05 ENCOUNTER — OFFICE VISIT (OUTPATIENT)
Dept: SURGERY | Facility: CLINIC | Age: 49
End: 2024-09-05

## 2024-09-05 DIAGNOSIS — M20.011 ACQUIRED MALLET DEFORMITY OF FINGER OF RIGHT HAND: Primary | ICD-10-CM

## 2024-09-05 DIAGNOSIS — M62.81 DISTAL MUSCLE WEAKNESS: Primary | ICD-10-CM

## 2024-09-05 DIAGNOSIS — M25.641 JOINT STIFFNESS OF HAND, RIGHT: ICD-10-CM

## 2024-09-05 PROCEDURE — 99213 OFFICE O/P EST LOW 20 MIN: CPT | Performed by: PLASTIC SURGERY

## 2024-09-05 PROCEDURE — 97760 ORTHOTIC MGMT&TRAING 1ST ENC: CPT | Performed by: OCCUPATIONAL THERAPIST

## 2024-09-19 ENCOUNTER — APPOINTMENT (OUTPATIENT)
Dept: SURGERY | Facility: CLINIC | Age: 49
End: 2024-09-19

## 2024-09-19 DIAGNOSIS — M62.81 DISTAL MUSCLE WEAKNESS: Primary | ICD-10-CM

## 2024-09-19 DIAGNOSIS — M25.641 JOINT STIFFNESS OF HAND, RIGHT: ICD-10-CM

## 2024-09-19 PROCEDURE — 97760 ORTHOTIC MGMT&TRAING 1ST ENC: CPT | Performed by: OCCUPATIONAL THERAPIST

## 2024-09-23 NOTE — PROGRESS NOTES
Subjective: I have been doing my exercises.      Objective:     Current level of performance:  ADL: Independent  Work:    Leisure: Not addressed    Measurements/Tests:  ROM:      N/A            Treatment Provided this day: Adjusted / re-fabricated a RMF extension splint.    Treatment Time: 20 minutes      Summary/Analysis of Treatment session: Patient is progressing well with OT goals and objectives.      Plan: To continue to be compliant with the wear and care of RMF extension splint x 2 weeks.      Follow up in:  10/03/2024          Bharath Ramirez  OTR/L

## 2025-06-11 ENCOUNTER — OFFICE VISIT (OUTPATIENT)
Dept: FAMILY MEDICINE CLINIC | Facility: CLINIC | Age: 50
End: 2025-06-11
Payer: COMMERCIAL

## 2025-06-11 VITALS
SYSTOLIC BLOOD PRESSURE: 118 MMHG | RESPIRATION RATE: 14 BRPM | DIASTOLIC BLOOD PRESSURE: 80 MMHG | HEART RATE: 65 BPM | WEIGHT: 188 LBS | OXYGEN SATURATION: 98 % | HEIGHT: 71 IN | BODY MASS INDEX: 26.32 KG/M2

## 2025-06-11 DIAGNOSIS — Z00.00 WELLNESS EXAMINATION: Primary | ICD-10-CM

## 2025-06-11 DIAGNOSIS — Z00.00 LABORATORY EXAM ORDERED AS PART OF ROUTINE GENERAL MEDICAL EXAMINATION: ICD-10-CM

## 2025-06-11 DIAGNOSIS — N52.9 ERECTILE DYSFUNCTION, UNSPECIFIED ERECTILE DYSFUNCTION TYPE: ICD-10-CM

## 2025-06-11 DIAGNOSIS — Z82.49 FAMILY HISTORY OF HEART DISEASE: ICD-10-CM

## 2025-06-11 DIAGNOSIS — Z12.11 SCREEN FOR COLON CANCER: ICD-10-CM

## 2025-06-11 DIAGNOSIS — Z12.5 SCREENING FOR PROSTATE CANCER: ICD-10-CM

## 2025-06-11 PROBLEM — R93.1 ELEVATED CORONARY ARTERY CALCIUM SCORE: Status: ACTIVE | Noted: 2025-06-11

## 2025-06-11 PROCEDURE — 99396 PREV VISIT EST AGE 40-64: CPT | Performed by: FAMILY MEDICINE

## 2025-06-11 PROCEDURE — 3079F DIAST BP 80-89 MM HG: CPT | Performed by: FAMILY MEDICINE

## 2025-06-11 PROCEDURE — 3074F SYST BP LT 130 MM HG: CPT | Performed by: FAMILY MEDICINE

## 2025-06-11 PROCEDURE — 3008F BODY MASS INDEX DOCD: CPT | Performed by: FAMILY MEDICINE

## 2025-06-11 RX ORDER — SILDENAFIL 100 MG/1
100 TABLET, FILM COATED ORAL
Qty: 8 TABLET | Refills: 11 | Status: SHIPPED | OUTPATIENT
Start: 2025-06-11

## 2025-06-11 RX ORDER — SILDENAFIL 50 MG/1
50 TABLET, FILM COATED ORAL
Qty: 8 TABLET | Refills: 11 | Status: SHIPPED | OUTPATIENT
Start: 2025-06-11 | End: 2025-06-11

## 2025-06-11 NOTE — PROGRESS NOTES
The following individual(s) verbally consented to be recorded using ambient AI listening technology and understand that they can each withdraw their consent to this listening technology at any point by asking the clinician to turn off or pause the recording:    Patient name: Onel Nanceale

## 2025-06-11 NOTE — PROGRESS NOTES
Subjective:   Onel Em is a 50 year old male who presents for Well Adult (Reviewed Preventative/Wellness form with patient./)       History/Other:   History of Present Illness  Onel Em is a 50 year old male who presents for annual physical.     He is seeking refills for sildenafil, which is effective for him. No urinary issues such as dribbling or nocturnal awakenings are present.    He has experienced recent weight gain, some of which was intentional. He notes a decrease in running and physical activity, which previously helped him manage his diet.    He has a history of a mallet finger injury treated with splinting. He continues to experience stiffness, particularly after activities like yard work, and is concerned about the possibility of arthritis.    He wears contacts and hearing aids, though he did not have the hearing aids in during the visit. No new skin lesions or moles are reported, and there are no issues with vision or hearing when using aids.    He has a family history of heart disease, with his father and brother having experienced heart attacks. A heart scan in 2023 showed mild plaque, and a normal stress test was conducted in July of that year.     Chief Complaint Reviewed and Verified  Nursing Notes Reviewed and   Verified  Tobacco Reviewed  Allergies Reviewed  Medications Reviewed    Problem List Reviewed  Medical History Reviewed  Surgical History   Reviewed  Family History Reviewed  Social History Reviewed         Tobacco:  He has never smoked tobacco.    Current Medications[1]    PHQ-2 SCORE: 0  , done 6/11/2025   Last [place] Suicide Screening on 6/11/2025 was No Risk.     Review of Systems:  Pertinent items are noted in HPI.    Objective:   /80   Pulse 65   Resp 14   Ht 5' 11\" (1.803 m)   Wt 188 lb (85.3 kg)   SpO2 98%   BMI 26.22 kg/m²  Estimated body mass index is 26.22 kg/m² as calculated from the following:    Height as of this encounter: 5'  11\" (1.803 m).    Weight as of this encounter: 188 lb (85.3 kg).  Physical Exam  GENERAL: Alert, cooperative, well developed, no acute distress.  HEENT: Normocephalic, normal oropharynx, moist mucous membranes, oral cavity normal.  CHEST: Clear to auscultation bilaterally, no wheezes, rhonchi, or crackles.  CARDIOVASCULAR: Normal heart rate and rhythm, S1 and S2 normal without murmurs.  ABDOMEN: Soft, non-tender, non-distended, without organomegaly, normal bowel sounds.  EXTREMITIES: No cyanosis or edema.  MUSCULOSKELETAL: Motor strength 5/5 in upper and lower extremities.  NEUROLOGICAL: Cranial nerves grossly intact, moves all extremities without gross motor or sensory deficit.      Assessment & Plan:   1. Wellness examination (Primary)  -Immunizations: Consider Tdap, prevnar, shingrex   -Metabolic: BMI 26. BP wnl. Due for annual labs   -Cancer screening: due for CRC screening  -Communicable disease: low risk   -Mental health: no concerns   -Other preventative: follow with dentistry and optometry.   -Lifestyle: Follow a well balanced healthy diet with emphasis on fruits, vegetables, whole grains, lean meats. Limit processed and junk foods. Aim for at least 150 minutes of moderate intensity exercise weekly. Make sure you are staying adequately hydrated. Aim to get 7-9 hours of sleep nightly.     2. Screen for colon cancer  Continues to experience rectal discharge - complete colonoscopy.   -     SURGERY - INTERNAL    3. Laboratory exam ordered as part of routine general medical examination  -     CBC With Differential With Platelet; Future; Expected date: 06/11/2025  -     Comp Metabolic Panel (14); Future; Expected date: 06/11/2025  -     Lipid Panel; Future; Expected date: 06/11/2025  -     TSH W Reflex To Free T4; Future; Expected date: 06/11/2025    4. Erectile dysfunction, unspecified erectile dysfunction type  Stable, CPM.   -     Discontinue: Sildenafil Citrate; Take 1 tablet (50 mg total) by mouth daily as  needed for Erectile Dysfunction.  Dispense: 8 tablet; Refill: 11  -     Sildenafil Citrate; Take 1 tablet (100 mg total) by mouth daily as needed for Erectile Dysfunction.  Dispense: 8 tablet; Refill: 11    5. Screening for prostate cancer  -     PSA Total, Screen; Future; Expected date: 06/11/2025    6. Family history of heart disease  Family history of heart disease. Previous heart scan showed mild ris. Discussed diet and cholesterol control.   - Emphasize the importance of diet and cholesterol management. Continue regular exercise.         Return in about 1 year (around 6/11/2026).        Paul Quiles MD, 6/11/2025, 6:40 PM             [1]   Current Outpatient Medications   Medication Sig Dispense Refill    Sildenafil Citrate 100 MG Oral Tab Take 1 tablet (100 mg total) by mouth daily as needed for Erectile Dysfunction. 8 tablet 11    albuterol (PROAIR HFA) 108 (90 Base) MCG/ACT Inhalation Aero Soln 2 puffs every 4-6 hours prn wheezing or shortness of breath 1 each 0    Multiple Vitamin Oral Tab Take 1 tablet by mouth daily.

## 2025-06-11 NOTE — PATIENT INSTRUCTIONS
Health Screening Guidelines, Men Ages 50 to 64   Screening tests and health counseling are a key part of managing your health. A screening test is done to find disorders or diseases in people who don't have any symptoms. Screening tests are not used to diagnose. They are used to find out if more testing is needed. The goal may be to find a disease early so it can be treated with more success. Or the goal may be to find a disease early so you can make lifestyle changes. You may need regular checkups to help you reduce your risk of disease.   Below are guidelines for men ages 50 to 64. Talk with your healthcare provider. Make sure you’re up-to-date on what you need.   We understand gender is a spectrum. We may use gendered terms to talk about anatomy and health risk. Please use this information in a way that works best for you and your provider as you talk about your care.   Screening Who needs it How often   Unhealthy alcohol use  All men in this age group  At routine exams   Blood pressure All men in this age group  Once a year if your blood pressure is normal. Normal blood pressure is less than 120/80 mm Hg. If your blood pressure is higher than this, follow the advice of your healthcare provider.    Colorectal cancer All men in this age group  Talk with your healthcare provider about which test below is right for you:   Colonoscopy every 10 years  Flexible sigmoidoscopy every 5 years or every 10 years with yearly fecal immunochemical test (FIT) stool test  CT colonography (virtual colonoscopy) every 5 years  Yearly fecal occult blood test  Yearly FIT  Stool FIT-DNA test (also called the stool DNA test) every 3 years  If you have a test that is not a colonoscopy and have an abnormal test result, you will need a colonoscopy.   You may need to be screened more or less often. This is based on personal or family health history. Talk with your healthcare provider.    Depression All men in this age group  At routine  exams   Type 2 diabetes or prediabetes  All men in this age group with no symptoms who are overweight or obese.  At least every 3 years (yearly if your blood sugar has already begun to rise)    Type 2 diabetes All men with prediabetes  Every year   Screening Who needs it How often   Hepatitis C All adults age 18 or older at least once in a lifetime.  Talk with your healthcare provider about your risk factors and how often to have hepatitis C screening.    High cholesterol or triglycerides  All men in this age group  About every 1 to 2 years. Expert groups vary in their advice. Talk with your healthcare provider about your risk factors and how often you should be tested.    HIV All men in this age group  At least 1 time. Talk with your healthcare provider about your risk factors. Ask if you should be tested more often.    Lung cancer All men in this age group who are in fairly good health and are at higher risk for lung cancer, and who:   Smoke or quit in the past 15 years  Have a 20-pack per year smoking history (1 pack a day for 20 years or 2 packs a day for 10 years)  Expert groups vary in their advice. Talk with your healthcare provider. Yearly lung cancer screening with a low-dose CT scan. Talk with your healthcare provider about your risk factors.    Obesity All men in this age group  At yearly routine exams    BMI (body mass index) All men in this age group Every year, to help find out if you are at a healthy weight for your height.    Prostate cancer All men in this age group, talk with your healthcare provider about risks and benefits of a digital rectal exam and prostate-specific antigen screening  At routine exams if you decide to be tested.    Syphilis Men at higher risk for infection  At routine exams. Talk with your healthcare provider.    Tuberculosis Men at higher risk for infection  Talk with your healthcare provider    Vision All men in this age group  Baseline screening at age 40. Talk with your  healthcare provider about how often to have vision exams.    Health counseling  Who needs it  How often    Diet and exercise Men who are overweight or obese  When diagnosed, and then at routine exams    Sexually transmitted infection prevention  Men at higher risk for infection  At routine exams, talk with your healthcare provider    Use of tobacco and the health effects it can cause  All men in this age group  Every exam   [employer] last reviewed this educational content on 4/1/2024  This information is for informational purposes only. This is not intended to be a substitute for professional medical advice, diagnosis, or treatment. Always seek the advice and follow the directions from your physician or other qualified health care provider.  © 0432-8861 [employer]. All rights reserved. This information is not intended as a substitute for professional medical care. Always follow your healthcare professional's instructions.

## 2025-06-19 ENCOUNTER — TELEPHONE (OUTPATIENT)
Dept: FAMILY MEDICINE CLINIC | Facility: CLINIC | Age: 50
End: 2025-06-19

## 2025-06-19 ENCOUNTER — LAB ENCOUNTER (OUTPATIENT)
Dept: LAB | Age: 50
End: 2025-06-19
Attending: FAMILY MEDICINE
Payer: COMMERCIAL

## 2025-06-19 DIAGNOSIS — Z12.5 SCREENING FOR PROSTATE CANCER: ICD-10-CM

## 2025-06-19 DIAGNOSIS — Z00.00 LABORATORY EXAM ORDERED AS PART OF ROUTINE GENERAL MEDICAL EXAMINATION: ICD-10-CM

## 2025-06-19 LAB
ALBUMIN SERPL-MCNC: 4.8 G/DL (ref 3.2–4.8)
ALBUMIN/GLOB SERPL: 1.7 {RATIO} (ref 1–2)
ALP LIVER SERPL-CCNC: 79 U/L (ref 45–117)
ALT SERPL-CCNC: 18 U/L (ref 10–49)
ANION GAP SERPL CALC-SCNC: 11 MMOL/L (ref 0–18)
AST SERPL-CCNC: 24 U/L (ref ?–34)
BASOPHILS # BLD AUTO: 0.05 X10(3) UL (ref 0–0.2)
BASOPHILS NFR BLD AUTO: 1 %
BILIRUB SERPL-MCNC: 0.9 MG/DL (ref 0.3–1.2)
BUN BLD-MCNC: 13 MG/DL (ref 9–23)
CALCIUM BLD-MCNC: 9.9 MG/DL (ref 8.7–10.6)
CHLORIDE SERPL-SCNC: 102 MMOL/L (ref 98–112)
CHOLEST SERPL-MCNC: 233 MG/DL (ref ?–200)
CO2 SERPL-SCNC: 27 MMOL/L (ref 21–32)
COMPLEXED PSA SERPL-MCNC: 0.56 NG/ML (ref ?–4)
CREAT BLD-MCNC: 1.15 MG/DL (ref 0.7–1.3)
EGFRCR SERPLBLD CKD-EPI 2021: 77 ML/MIN/1.73M2 (ref 60–?)
EOSINOPHIL # BLD AUTO: 0.14 X10(3) UL (ref 0–0.7)
EOSINOPHIL NFR BLD AUTO: 2.9 %
ERYTHROCYTE [DISTWIDTH] IN BLOOD BY AUTOMATED COUNT: 11.8 %
FASTING PATIENT LIPID ANSWER: YES
FASTING STATUS PATIENT QL REPORTED: YES
GLOBULIN PLAS-MCNC: 2.8 G/DL (ref 2–3.5)
GLUCOSE BLD-MCNC: 87 MG/DL (ref 70–99)
HCT VFR BLD AUTO: 47.5 % (ref 39–53)
HDLC SERPL-MCNC: 50 MG/DL (ref 40–59)
HGB BLD-MCNC: 16.4 G/DL (ref 13–17.5)
IMM GRANULOCYTES # BLD AUTO: 0.04 X10(3) UL (ref 0–1)
IMM GRANULOCYTES NFR BLD: 0.8 %
LDLC SERPL CALC-MCNC: 169 MG/DL (ref ?–100)
LYMPHOCYTES # BLD AUTO: 1.58 X10(3) UL (ref 1–4)
LYMPHOCYTES NFR BLD AUTO: 32.3 %
MCH RBC QN AUTO: 31.2 PG (ref 26–34)
MCHC RBC AUTO-ENTMCNC: 34.5 G/DL (ref 31–37)
MCV RBC AUTO: 90.3 FL (ref 80–100)
MONOCYTES # BLD AUTO: 0.53 X10(3) UL (ref 0.1–1)
MONOCYTES NFR BLD AUTO: 10.8 %
NEUTROPHILS # BLD AUTO: 2.55 X10 (3) UL (ref 1.5–7.7)
NEUTROPHILS # BLD AUTO: 2.55 X10(3) UL (ref 1.5–7.7)
NEUTROPHILS NFR BLD AUTO: 52.2 %
NONHDLC SERPL-MCNC: 183 MG/DL (ref ?–130)
OSMOLALITY SERPL CALC.SUM OF ELEC: 289 MOSM/KG (ref 275–295)
PLATELET # BLD AUTO: 268 10(3)UL (ref 150–450)
POTASSIUM SERPL-SCNC: 4.1 MMOL/L (ref 3.5–5.1)
PROT SERPL-MCNC: 7.6 G/DL (ref 5.7–8.2)
RBC # BLD AUTO: 5.26 X10(6)UL (ref 4.3–5.7)
SODIUM SERPL-SCNC: 140 MMOL/L (ref 136–145)
TRIGL SERPL-MCNC: 82 MG/DL (ref 30–149)
TSI SER-ACNC: 1.38 UIU/ML (ref 0.55–4.78)
VLDLC SERPL CALC-MCNC: 16 MG/DL (ref 0–30)
WBC # BLD AUTO: 4.9 X10(3) UL (ref 4–11)

## 2025-06-19 PROCEDURE — 80050 GENERAL HEALTH PANEL: CPT | Performed by: FAMILY MEDICINE

## 2025-06-19 PROCEDURE — 80061 LIPID PANEL: CPT | Performed by: FAMILY MEDICINE

## 2025-06-19 PROCEDURE — 84153 ASSAY OF PSA TOTAL: CPT | Performed by: FAMILY MEDICINE

## 2025-06-19 NOTE — TELEPHONE ENCOUNTER
Patient dropped off form for work. Created copy and routed original to provider. Please call patient when form is ready.

## 2025-06-20 NOTE — TELEPHONE ENCOUNTER
Spoke to patient regarding lab results, informed that forms were also available to . Patient verbalizes understanding.

## 2025-06-23 NOTE — TELEPHONE ENCOUNTER
Patients rolando Peck picked up forms. ID checked. Called patient to verify since he is not on release form.

## (undated) NOTE — LETTER
24      Patient: Onel Em  : 1975 Visit date: 2024    Dear Dipesh,      I examined your patient in consultation today.    He has a right middle finger closed mallet without fracture.  We have fashioned an extension splint.    Thank you for your kind referral. If I may answer any questions, please feel free to contact me.     Sincerely,   Etienne Meneses MD     CC: Paul Quiles MD